# Patient Record
Sex: FEMALE | Race: WHITE | URBAN - METROPOLITAN AREA
[De-identification: names, ages, dates, MRNs, and addresses within clinical notes are randomized per-mention and may not be internally consistent; named-entity substitution may affect disease eponyms.]

---

## 2017-02-21 PROBLEM — 110483000 TOBACCO USE: Status: ACTIVE | Noted: 2017-02-20

## 2017-06-02 PROBLEM — 14760008 CONSTIPATION: Status: ACTIVE | Noted: 2017-06-02

## 2017-06-02 PROBLEM — 193462001 INSOMNIA: Status: ACTIVE | Noted: 2017-06-02

## 2021-05-11 ENCOUNTER — OFFICE VISIT (OUTPATIENT)
Dept: URBAN - METROPOLITAN AREA CLINIC 35 | Facility: CLINIC | Age: 71
End: 2021-05-11

## 2021-05-11 VITALS
DIASTOLIC BLOOD PRESSURE: 78 MMHG | WEIGHT: 113 LBS | BODY MASS INDEX: 20.8 KG/M2 | SYSTOLIC BLOOD PRESSURE: 148 MMHG | HEIGHT: 62 IN

## 2021-05-11 RX ORDER — SUCRALFATE 1 G
1 TABLET ON AN EMPTY STOMACH, 2 HOURS APART FROM OTHER MEDS TABLET ORAL TWICE A DAY
Qty: 60 | Refills: 1 | OUTPATIENT
Start: 2021-05-11

## 2021-05-11 RX ORDER — POLYETHYLENE GLYCOL 3350 17 G/17G
17 GM MIXED IN 10 OZ OF FLUID POWDER, FOR SOLUTION ORAL TWICE A DAY
Qty: 1504 GM | Refills: 2 | Status: DISCONTINUED | COMMUNITY

## 2021-05-11 RX ORDER — CARVEDILOL 3.12 MG/1
TABLET, FILM COATED ORAL TWICE A DAY
Status: ACTIVE | COMMUNITY

## 2021-05-11 RX ORDER — IRON BISGLY,PS/B12/BIOT/MAG/ZN 160-60/10
10 ML LIQUID (ML) ORAL ONCE A DAY
Qty: 300 MILLILITER | Refills: 1 | OUTPATIENT
Start: 2021-05-11

## 2021-05-11 RX ORDER — GABAPENTIN 300 MG/1
1 CAPSULE CAPSULE ORAL QHS
Status: ACTIVE | COMMUNITY

## 2021-05-11 RX ORDER — PANTOPRAZOLE SODIUM 40 MG/1
1 TABLET TABLET, DELAYED RELEASE ORAL
Qty: 60 | Refills: 2 | OUTPATIENT
Start: 2021-05-11

## 2021-05-11 RX ORDER — FERROUS SULFATE 142(45)MG
1 TABLET TABLET, EXTENDED RELEASE ORAL ONCE A DAY
Qty: 30 | Refills: 2 | OUTPATIENT
Start: 2021-05-11

## 2021-05-11 RX ORDER — SUCRALFATE 1 G/1
1 TABLET ON AN EMPTY STOMACH TABLET ORAL TID
Status: ACTIVE | COMMUNITY

## 2021-05-11 RX ORDER — ASCORBIC ACID, FOLIC ACID, VITAMIN B-12, BIOTIN, IRON, MAGNESIUM, ZINC 100; 1; 60; 150; 160; 6.5; 12; 5 MG/1; MG/1; UG/1; UG/1; MG/1; MG/1; MG/1; MG/1
1 TABLET TABLET ORAL ONCE A DAY
Qty: 90 TABLET | Status: DISCONTINUED | COMMUNITY
Start: 2019-12-06

## 2021-05-11 RX ORDER — MULTIVIT WITH MINERALS/LUTEIN
1 CAPSULE TABLET ORAL ONCE A DAY
Status: ACTIVE | COMMUNITY

## 2021-05-11 RX ORDER — ASCORBIC ACID, FOLIC ACID, VITAMIN B-12, BIOTIN, IRON, MAGNESIUM, ZINC 100; 1; 60; 150; 160; 6.5; 12; 5 MG/1; MG/1; UG/1; UG/1; MG/1; MG/1; MG/1; MG/1
1 TABLET TABLET ORAL ONCE A DAY
Qty: 90 TABLET | Refills: 1 | Status: DISCONTINUED | COMMUNITY

## 2021-05-11 RX ORDER — PANTOPRAZOLE SODIUM 40 MG/1
1 TABLET TABLET, DELAYED RELEASE ORAL ONCE A DAY
Status: ACTIVE | COMMUNITY

## 2021-05-11 RX ORDER — RANITIDINE HYDROCHLORIDE 300 MG/1
1 CAPSULE BEFORE BREAKFAST AND DINNER CAPSULE ORAL
Qty: 180 | Refills: 1 | Status: DISCONTINUED | COMMUNITY

## 2021-05-11 RX ORDER — CHOLECALCIFEROL TAB 50 MCG (2000 UNIT) 50 MCG
1 TABLET TAB ORAL ONCE A DAY
Status: ACTIVE | COMMUNITY

## 2021-05-11 RX ORDER — ESTRADIOL 0.75 MG/1.25G
1 APPLICATION TO SKIN GEL, METERED TOPICAL ONCE A DAY
Status: ACTIVE | COMMUNITY

## 2021-05-11 NOTE — HPI-MIGRATED HPI
Acute visit : Patient is here for an acute visit -> ER follow up Patient was admitted to [facility] due on 04/2020 due to melena, fatigue and nausea/vomiting w/ coffee-groound emesis. On admission her Hgb was 7.1 and Hct 21.8 s/p transfused with PRB. 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 S/p treated with endoscopic cautherization and medical mangement with PPI and carafate at that time.  EGD performed by me on 04/14/2021: The esophagus was normal. The GE junction was irregular. Moderate erosive gastritis was found in the gastric antrum/pre-pylorus region of the stomach and pylorus with gastric antrum bxx showing marked regenerative changes suggestive or reactive gastropthy. No evidence of H. pylori or IM. Normal duodenum  Patient was discharged in stable conditions and was prescribed Sucralfate 1 gm BID + Pantoprazole 40 mg BID  Reports: No improvement after being discharged, continues with intermittent lightheadness/dizziness and aching in the lower extremities that usually occur at night. Current BM: 1 BM daily, admits she ocassionally skips a day. Variation in consistency soft-hard. Denies melena or hematemesis. Patient denies taking iron supplements due to intolerance, usually causes her severe constipation and vomiting. Patient was previously on MaxaFe with no problems with SE  Patient was seen by GP, Dr. Spicer on 05/07/2021 and had repeat labs, see below;   Interim investigations : Labs done on: ->  * 05/07/2021, ordered by PCP:  - Glu: 90; BUN: 13; Na: 141; K: 4.1; Alb: 4.2; ALT: 17; AST: 18; ALP: 106 (H); Tbili: 0.5 - Thyroid stimulating hormne: 1.69 - WBC: 6.6; RBC: 3.52 (L); Hgb: 9.9 (L); Hct: 33.5; Plt: 319 - Fe: 14 (L); TIBC: 452 (H); UIBC: 4.38; Fe sat: 3 % (L); Ferritin: 16;

## 2021-05-14 ENCOUNTER — TELEPHONE ENCOUNTER (OUTPATIENT)
Dept: URBAN - METROPOLITAN AREA CLINIC 35 | Facility: CLINIC | Age: 71
End: 2021-05-14

## 2021-05-17 ENCOUNTER — TELEPHONE ENCOUNTER (OUTPATIENT)
Dept: URBAN - METROPOLITAN AREA CLINIC 35 | Facility: CLINIC | Age: 71
End: 2021-05-17

## 2021-07-11 ENCOUNTER — LAB OUTSIDE AN ENCOUNTER (OUTPATIENT)
Dept: URBAN - METROPOLITAN AREA CLINIC 35 | Facility: CLINIC | Age: 71
End: 2021-07-11

## 2021-07-13 LAB
% SATURATION: 11
ABSOLUTE BASOPHILS: 60
ABSOLUTE EOSINOPHILS: 248
ABSOLUTE LYMPHOCYTES: 1219
ABSOLUTE MONOCYTES: 677
ABSOLUTE NEUTROPHILS: 4496
BASOPHILS: 0.9
EOSINOPHILS: 3.7
FERRITIN: 36
HEMATOCRIT: 40.2
HEMOGLOBIN: 13.2
IRON BINDING CAPACITY: 358
IRON, TOTAL: 38
LYMPHOCYTES: 18.2
MCH: 29.9
MCHC: 32.8
MCV: 91
MONOCYTES: 10.1
MPV: 10
NEUTROPHILS: 67.1
PLATELET COUNT: 244
RDW: 17.3
RED BLOOD CELL COUNT: 4.42
WHITE BLOOD CELL COUNT: 6.7

## 2021-07-27 ENCOUNTER — OFFICE VISIT (OUTPATIENT)
Dept: URBAN - METROPOLITAN AREA CLINIC 35 | Facility: CLINIC | Age: 71
End: 2021-07-27

## 2021-07-27 ENCOUNTER — LAB OUTSIDE AN ENCOUNTER (OUTPATIENT)
Dept: URBAN - METROPOLITAN AREA CLINIC 35 | Facility: CLINIC | Age: 71
End: 2021-07-27

## 2021-07-27 VITALS
SYSTOLIC BLOOD PRESSURE: 142 MMHG | WEIGHT: 109 LBS | BODY MASS INDEX: 20.06 KG/M2 | HEART RATE: 60 BPM | OXYGEN SATURATION: 99 % | HEIGHT: 62 IN | DIASTOLIC BLOOD PRESSURE: 80 MMHG

## 2021-07-27 RX ORDER — IRON BISGLY,PS/B12/BIOT/MAG/ZN 160-60/10
10 ML LIQUID (ML) ORAL ONCE A DAY
Qty: 300 MILLILITER | Refills: 1 | Status: ON HOLD | COMMUNITY
Start: 2021-05-11

## 2021-07-27 RX ORDER — SUCRALFATE 1 G
1 TABLET ON AN EMPTY STOMACH, 2 HOURS APART FROM OTHER MEDS TABLET ORAL TWICE A DAY
Qty: 60 | Refills: 1 | Status: ON HOLD | COMMUNITY
Start: 2021-05-11

## 2021-07-27 RX ORDER — SUCRALFATE 1 G
1 TABLET ON AN EMPTY STOMACH, 2 HOURS APART FROM OTHER MEDS TABLET ORAL TWICE A DAY
Qty: 60 | Refills: 1 | OUTPATIENT

## 2021-07-27 RX ORDER — ESTRADIOL 0.75 MG/1.25G
1 APPLICATION TO SKIN GEL, METERED TOPICAL ONCE A DAY
Status: ACTIVE | COMMUNITY

## 2021-07-27 RX ORDER — GABAPENTIN 300 MG/1
1 CAPSULE CAPSULE ORAL QHS
Status: ACTIVE | COMMUNITY

## 2021-07-27 RX ORDER — FERROUS SULFATE 142(45)MG
2 TABLETS TABLET, EXTENDED RELEASE ORAL ONCE A DAY
Qty: 60 TABLET | Refills: 2 | OUTPATIENT

## 2021-07-27 RX ORDER — CARVEDILOL 3.12 MG/1
TABLET, FILM COATED ORAL TWICE A DAY
Status: ACTIVE | COMMUNITY

## 2021-07-27 RX ORDER — PANTOPRAZOLE SODIUM 40 MG/1
1 TABLET TABLET, DELAYED RELEASE ORAL
Qty: 60 | Refills: 2 | Status: ACTIVE | COMMUNITY
Start: 2021-05-11

## 2021-07-27 RX ORDER — PANTOPRAZOLE SODIUM 40 MG/1
1 TABLET TABLET, DELAYED RELEASE ORAL
Qty: 60 | Refills: 2

## 2021-07-27 RX ORDER — MULTIVIT WITH MINERALS/LUTEIN
1 CAPSULE TABLET ORAL ONCE A DAY
Status: ACTIVE | COMMUNITY

## 2021-07-27 RX ORDER — FERROUS SULFATE 142(45)MG
1 TABLET TABLET, EXTENDED RELEASE ORAL ONCE A DAY
Qty: 30 | Refills: 2 | Status: ACTIVE | COMMUNITY
Start: 2021-05-11

## 2021-07-27 RX ORDER — CHOLECALCIFEROL TAB 50 MCG (2000 UNIT) 50 MCG
1 TABLET TAB ORAL ONCE A DAY
Status: ACTIVE | COMMUNITY

## 2021-07-27 NOTE — HPI-MIGRATED HPI
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 S/p treated with endoscopic cautherization and medical mangement with PPI and carafate at that time.  Patient was admitted to [facility] due on 04/2020 due to melena, fatigue and nausea/vomiting w/ coffee-ground emesis. On admission her Hgb was 7.1 and Hct 21.8 s/p transfused with PRB.   EGD performed by me on 04/14/2021: The esophagus was normal. The GE junction was irregular. Moderate erosive gastritis was found in the gastric antrum/pre-pylorus region of the stomach and pylorus with gastric antrum bxx showing marked regenerative changes suggestive or reactive gastropthy. No evidence of H. pylori or IM. Normal duodenum.  Patient has been on Slow Fe daily. Manufacture discontinude MaxFe. Reports: patient denies lightheadness/dizziness  Also taking Pantoprazole 40 mg BID  Patient was also prescribed Carafate 1 gm BID but only took med for 1 month and discontinues b/c it didnt help with epigastric pain She has been taking OTC Zantac with eating;   Follow up OV : Current symptoms are -> Patient admits heartburn and epigastric pain after eating. Patient admits she only eats once a day to avoid pain.  Current BM: 3-4 loose BM daily which started about about a month ago. Loose stools is after everytime she eats.  No nocturnal diarrhea  Patient denies blood in stool or melena Patient denies trying any OTC medications Had lap CCY in 2000's;   Follow up OV : Patient is here for a routine OV for -> Iron deficiency anemia and Gastric ulcer;   Interim investigations : Labs done on: ->  * 07/12/2021, see below;

## 2021-08-03 ENCOUNTER — TELEPHONE ENCOUNTER (OUTPATIENT)
Dept: URBAN - METROPOLITAN AREA CLINIC 35 | Facility: CLINIC | Age: 71
End: 2021-08-03

## 2021-08-04 ENCOUNTER — OFFICE VISIT (OUTPATIENT)
Dept: URBAN - METROPOLITAN AREA SURGERY CENTER 8 | Facility: SURGERY CENTER | Age: 71
End: 2021-08-04

## 2021-08-20 ENCOUNTER — OFFICE VISIT (OUTPATIENT)
Dept: URBAN - METROPOLITAN AREA CLINIC 35 | Facility: CLINIC | Age: 71
End: 2021-08-20

## 2021-08-20 VITALS — BODY MASS INDEX: 19.14 KG/M2 | HEIGHT: 62 IN | WEIGHT: 104 LBS

## 2021-08-20 PROBLEM — 62315008: Status: ACTIVE | Noted: 2021-07-27

## 2021-08-20 RX ORDER — ZOLPIDEM TARTRATE 5 MG/1
1 TABLET AT BEDTIME AS NEEDED TABLET, FILM COATED ORAL ONCE A DAY
Status: ACTIVE | COMMUNITY

## 2021-08-20 RX ORDER — MULTIVIT WITH MINERALS/LUTEIN
1 CAPSULE TABLET ORAL ONCE A DAY
Status: ACTIVE | COMMUNITY

## 2021-08-20 RX ORDER — PANTOPRAZOLE SODIUM 40 MG/1
1 TABLET TABLET, DELAYED RELEASE ORAL
Qty: 30 | Refills: 3

## 2021-08-20 RX ORDER — GABAPENTIN 300 MG/1
1 CAPSULE CAPSULE ORAL QHS
Status: ACTIVE | COMMUNITY

## 2021-08-20 RX ORDER — CARVEDILOL 3.12 MG/1
TABLET, FILM COATED ORAL TWICE A DAY
Status: ACTIVE | COMMUNITY

## 2021-08-20 RX ORDER — IRON BISGLY,PS/B12/BIOT/MAG/ZN 160-60/10
10 ML LIQUID (ML) ORAL ONCE A DAY
Qty: 300 MILLILITER | Refills: 1 | Status: ON HOLD | COMMUNITY
Start: 2021-05-11

## 2021-08-20 RX ORDER — FERROUS SULFATE 142(45)MG
2 TABLETS TABLET, EXTENDED RELEASE ORAL ONCE A DAY
Qty: 60 TABLET | Refills: 2 | Status: ACTIVE | COMMUNITY

## 2021-08-20 RX ORDER — CHOLECALCIFEROL TAB 50 MCG (2000 UNIT) 50 MCG
1 TABLET TAB ORAL ONCE A DAY
Status: ACTIVE | COMMUNITY

## 2021-08-20 RX ORDER — FAMOTIDINE 10 MG
2 CAPSULES TABLET ORAL
Qty: 120 | Refills: 5 | OUTPATIENT
Start: 2021-08-20

## 2021-08-20 RX ORDER — FERROUS SULFATE 142(45)MG
2 TABLETS TABLET, EXTENDED RELEASE ORAL ONCE A DAY
Qty: 60 TABLET | Refills: 2

## 2021-08-20 RX ORDER — PANTOPRAZOLE SODIUM 40 MG/1
1 TABLET TABLET, DELAYED RELEASE ORAL
Qty: 60 | Refills: 2 | Status: ACTIVE | COMMUNITY

## 2021-08-20 RX ORDER — ESTRADIOL 0.75 MG/1.25G
1 APPLICATION TO SKIN GEL, METERED TOPICAL ONCE A DAY
Status: ACTIVE | COMMUNITY

## 2021-08-20 NOTE — EXAM-MIGRATED EXAMINATIONS
GENERAL APPEARANCE: - alert, in no acute distress, well developed, well nourished;   HEAD: - normocephalic, atraumatic;   EYES: - sclera anicteric bilaterally;   NECK/THYROID: - neck supple, full range of motion, no carotid bruit, no jugular venous distention, no lymphadenopathy, no thyroid nodules, no thyromegaly, trachea midline;   NEUROLOGIC: - cranial nerves 2-12 grossly intact;   PSYCH: - good eye contact, mood/affect full range;

## 2021-08-20 NOTE — HPI-MIGRATED HPI
Post-op OV : Patient reports of current symptoms -> epigastric pain after meals and with mild improvement when she takes Zantac PRN, denies dysphagia ;   Post-op OV : Patient -> denies any new changes in his/her health status since last OV.  Patient continues taking Pantoprazole 40 mg BID + Zantac PRN  Patient was previously prescribed Carafate 1 gm BID , which she took for 1 month only and discontinued due to no relief of symptoms;   Post-op OV : After EGD on -> 08/04/2021, done due to epigastric pain and re-assess Gastritis and history of gastric ulcers. The esophagus was normal with distal esophageal bxx showing mild reflux esophagitis, negative for IM. The GE junction was irregular. Moderate gastritis was found in the gastric antrum with bxx showing reactive gastropathy with positive for intestinal metaplasia. Mild gastritis and bilious gastric fluid was found in the gastric body with bxx showing reactive gastropathy. No evidence of H. pylori. Normal duodenum;   Post-op OV : Patient denies -> rectal bleeding, fever, nausea & vomiting since the procedure date;   Post-op OV : After ERCP on -> ;   Follow up OV : Last OV was -> 3 weeks ago Iron deficiency anemia:  Patient was advised to increase OTC Slow Fe to 2 tab daily  Plan to repeat labs 11/2022 Patient had repeat EGD done as mentioned above   Diarrhea:  Patient was advised to take  OTC Imodium 2 mg Qhrs PRN due to c/o of diarrhea last OV.  However, diarrhea has resolved and doesn't take medication. Patient admits mild constipation while taking Slow Fe. Therefore, take OTC Women's General Laxative Current BM: 1 soft BM daily when she uses OTC Genereal Laxative PRN, denies blood in stool or melena;   Follow up OV : Current symptoms are -> ;   Follow up OV : Patient is here for a routine OV for -> Iron deficiency anemia and Diarrhea;

## 2021-11-01 ENCOUNTER — LAB OUTSIDE AN ENCOUNTER (OUTPATIENT)
Dept: URBAN - METROPOLITAN AREA CLINIC 35 | Facility: CLINIC | Age: 71
End: 2021-11-01

## 2021-11-02 LAB
% SATURATION: 14
ABSOLUTE BASOPHILS: 63
ABSOLUTE EOSINOPHILS: 229
ABSOLUTE LYMPHOCYTES: 1375
ABSOLUTE MONOCYTES: 774
ABSOLUTE NEUTROPHILS: 5459
BASOPHILS: 0.8
EOSINOPHILS: 2.9
FERRITIN: 40
HEMATOCRIT: 41.4
HEMOGLOBIN: 13.6
IRON BINDING CAPACITY: 359
IRON, TOTAL: 50
LYMPHOCYTES: 17.4
MCH: 31.7
MCHC: 32.9
MCV: 96.5
MONOCYTES: 9.8
MPV: 10.6
NEUTROPHILS: 69.1
PLATELET COUNT: 284
RDW: 13.2
RED BLOOD CELL COUNT: 4.29
WHITE BLOOD CELL COUNT: 7.9

## 2021-11-09 ENCOUNTER — OFFICE VISIT (OUTPATIENT)
Dept: URBAN - METROPOLITAN AREA CLINIC 35 | Facility: CLINIC | Age: 71
End: 2021-11-09

## 2021-11-09 ENCOUNTER — LAB OUTSIDE AN ENCOUNTER (OUTPATIENT)
Dept: URBAN - METROPOLITAN AREA CLINIC 35 | Facility: CLINIC | Age: 71
End: 2021-11-09

## 2021-11-09 VITALS — BODY MASS INDEX: 18.77 KG/M2 | WEIGHT: 102 LBS | HEIGHT: 62 IN

## 2021-11-09 RX ORDER — PANTOPRAZOLE SODIUM 40 MG/1
1 TABLET TABLET, DELAYED RELEASE ORAL
Qty: 90 | Refills: 4

## 2021-11-09 RX ORDER — PANTOPRAZOLE SODIUM 40 MG/1
1 TABLET TABLET, DELAYED RELEASE ORAL
Qty: 30 | Refills: 3 | Status: ACTIVE | COMMUNITY

## 2021-11-09 RX ORDER — FERROUS SULFATE 142(45)MG
1 TABLET TABLET, EXTENDED RELEASE ORAL ONCE A DAY
Qty: 30 TABLET | Refills: 2

## 2021-11-09 RX ORDER — MULTIVIT WITH MINERALS/LUTEIN
1 CAPSULE TABLET ORAL ONCE A DAY
Status: ACTIVE | COMMUNITY

## 2021-11-09 RX ORDER — CARVEDILOL 3.12 MG/1
TABLET, FILM COATED ORAL TWICE A DAY
Status: ACTIVE | COMMUNITY

## 2021-11-09 RX ORDER — IRON BISGLY,PS/B12/BIOT/MAG/ZN 160-60/10
10 ML LIQUID (ML) ORAL ONCE A DAY
Qty: 300 MILLILITER | Refills: 1 | Status: ON HOLD | COMMUNITY
Start: 2021-05-11

## 2021-11-09 RX ORDER — GABAPENTIN 300 MG/1
1 CAPSULE CAPSULE ORAL QHS
Status: ACTIVE | COMMUNITY

## 2021-11-09 RX ORDER — FAMOTIDINE 10 MG
2 CAPSULES TABLET ORAL
Qty: 120 | Refills: 5

## 2021-11-09 RX ORDER — SODIUM, POTASSIUM,MAG SULFATES 17.5-3.13G
177ML SOLUTION, RECONSTITUTED, ORAL ORAL ONCE
Qty: 1 KIT | Refills: 0 | OUTPATIENT
Start: 2021-11-09

## 2021-11-09 RX ORDER — FAMOTIDINE 10 MG
2 CAPSULES TABLET ORAL
Qty: 120 | Refills: 5 | Status: ACTIVE | COMMUNITY
Start: 2021-08-20

## 2021-11-09 RX ORDER — FERROUS SULFATE 142(45)MG
2 TABLETS TABLET, EXTENDED RELEASE ORAL ONCE A DAY
Qty: 60 TABLET | Refills: 2 | Status: ACTIVE | COMMUNITY

## 2021-11-09 RX ORDER — CHOLECALCIFEROL TAB 50 MCG (2000 UNIT) 50 MCG
1 TABLET TAB ORAL ONCE A DAY
Status: ACTIVE | COMMUNITY

## 2021-11-09 RX ORDER — ZOLPIDEM TARTRATE 5 MG/1
1 TABLET AT BEDTIME AS NEEDED TABLET, FILM COATED ORAL ONCE A DAY
Status: ACTIVE | COMMUNITY

## 2021-11-09 RX ORDER — ESTRADIOL 0.75 MG/1.25G
1 APPLICATION TO SKIN GEL, METERED TOPICAL ONCE A DAY
Status: ACTIVE | COMMUNITY

## 2021-11-09 NOTE — HPI-MIGRATED HPI
Colorectal cancer screening : Current bowel movement patterns -> One soft BM daily;   Colorectal cancer screening : Family history of GI malignancy: -> Denies;   Colorectal cancer screening : Patient is here for -> high risk surveillance colonoscopy due to personal history of colonic polyps;   Colorectal cancer screening : Last colonoscopy was -> 2016;   Colorectal cancer screening : Patients denies -> rectal bleeding, change in bowel habits, constipation, diarrhea, or abdominal pain;   Interim investigations : Labs done on: ->  * 11/01/2021, see below;   Follow up OV : Patient is here for a routine OV for -> Iron deficiency anemia/Gastritis/Gastric ulcers, Epigastric pain and Constipation;   Follow up OV : Last OV was -> 3 weeks ago  Patient has a 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 S/p treated with endoscopic cautherization and medical mangement with PPI and carafate at that time.  Patient was admitted to [facility] on 04/2020 due to melena, fatigue and nausea/vomiting w/ coffee-ground emesis. On admission her Hgb was 7.1 and Hct 21.8 s/p transfused with PRBC.   Subsequently had an EGD done 04/14/2021 performed by me. The esophagus was normal. The GE junction was irregular. Moderate erosive gastritis was found in the gastric antrum/pre-pylorus region of the stomach and pylorus with gastric antrum bxx showing marked regenerative changes suggestive or reactive gastropthy. No evidence of H. pylori or IM  Then had an EGD done 08/04/2021, done due to epigastric pain. The esophagus was normal with distal esophageal bxx showing mild reflux esophagitis, negative for IM. The GE junction was irregular. Moderate gastritis was found in the gastric antrum with bxx showing reactive gastropathy with positive for intestinal metaplasia. Mild gastritis and bilious gastric fluid was found in the gastric body with bxx showing reactive gastropathy. No evidence of H. pylori. Normal duodenum.  Patient has been taking Pantoprazole 40 mg BID but patient c/o of epigastric pain despite taking PPI.  Therefore was prescribed Zantac 360 ER, 3-6 caps daily + Pantoprazole 40 mg BID Patient is taking Zantac 360, 2 cap BID and OTC antacid chewables PRN Current symptoms: epigastric pain after eating.  Patient was initally on Slow Fe daily but labs done 07/12/2021 showed Fe sat: 11 % (L); Ferritin: 36; Total, Iron: 38 (L) and Hgb: 13.2. Therefore, advised to increase OTC Slow Fe, 2 tab daily. Patient admits mild constipation while taking Slow Fe. Therefore, takes OTC Women's General Laxative PRN Current BM: 1 formed BM daily, denies blood in stool or melena;   Follow up OV : Current symptoms are -> ;     Colorectal cancer screening : Denies dysphagia or odynophagia Currently taking anticoagulants/NSAIDs: Denies;

## 2021-11-19 ENCOUNTER — OFFICE VISIT (OUTPATIENT)
Dept: URBAN - METROPOLITAN AREA SURGERY CENTER 8 | Facility: SURGERY CENTER | Age: 71
End: 2021-11-19

## 2021-12-07 ENCOUNTER — LAB OUTSIDE AN ENCOUNTER (OUTPATIENT)
Dept: URBAN - METROPOLITAN AREA CLINIC 35 | Facility: CLINIC | Age: 71
End: 2021-12-07

## 2021-12-07 ENCOUNTER — OFFICE VISIT (OUTPATIENT)
Dept: URBAN - METROPOLITAN AREA CLINIC 35 | Facility: CLINIC | Age: 71
End: 2021-12-07
Payer: COMMERCIAL

## 2021-12-07 VITALS
OXYGEN SATURATION: 99 % | DIASTOLIC BLOOD PRESSURE: 80 MMHG | WEIGHT: 108 LBS | SYSTOLIC BLOOD PRESSURE: 152 MMHG | HEIGHT: 62 IN | HEART RATE: 60 BPM | BODY MASS INDEX: 19.88 KG/M2

## 2021-12-07 DIAGNOSIS — K29.70 GASTRITIS WITHOUT BLEEDING, UNSPECIFIED CHRONICITY, UNSPECIFIED GASTRITIS TYPE: ICD-10-CM

## 2021-12-07 DIAGNOSIS — K25.7 CHRONIC GASTRIC ULCER WITHOUT HEMORRHAGE OR PERFORATION: ICD-10-CM

## 2021-12-07 DIAGNOSIS — Z86.010 PERSONAL HISTORY OF COLONIC POLYPS: ICD-10-CM

## 2021-12-07 DIAGNOSIS — K57.30 DIVERTICULOSIS OF LARGE INTESTINE WITHOUT PERFORATION OR ABSCESS WITHOUT BLEEDING: ICD-10-CM

## 2021-12-07 DIAGNOSIS — D50.9 IRON DEFICIENCY ANEMIA, UNSPECIFIED: ICD-10-CM

## 2021-12-07 DIAGNOSIS — K63.5 POLYP OF COLON, UNSPECIFIED PART OF COLON, UNSPECIFIED TYPE: ICD-10-CM

## 2021-12-07 DIAGNOSIS — K64.2 THIRD DEGREE HEMORRHOIDS: ICD-10-CM

## 2021-12-07 PROCEDURE — 99214 OFFICE O/P EST MOD 30 MIN: CPT | Performed by: INTERNAL MEDICINE

## 2021-12-07 RX ORDER — FERROUS SULFATE 142(45)MG
1 TABLET TABLET, EXTENDED RELEASE ORAL ONCE A DAY
Qty: 30 TABLET | Refills: 2 | Status: ACTIVE | COMMUNITY

## 2021-12-07 RX ORDER — FAMOTIDINE 10 MG
2 CAPSULES TABLET ORAL
Qty: 120 | Refills: 5 | Status: ACTIVE | COMMUNITY

## 2021-12-07 RX ORDER — ZOLPIDEM TARTRATE 5 MG/1
1 TABLET AT BEDTIME AS NEEDED TABLET, FILM COATED ORAL ONCE A DAY
Status: ACTIVE | COMMUNITY

## 2021-12-07 RX ORDER — CARVEDILOL 3.12 MG/1
TABLET, FILM COATED ORAL TWICE A DAY
Status: ACTIVE | COMMUNITY

## 2021-12-07 RX ORDER — CALCIUM CARBONATE 500 MG/1
4 TABLETS TABLET ORAL TID
Status: ON HOLD | COMMUNITY

## 2021-12-07 RX ORDER — FERROUS SULFATE 142(45)MG
1 TABLET TABLET, EXTENDED RELEASE ORAL ONCE A DAY
Qty: 30 TABLET | Refills: 2

## 2021-12-07 RX ORDER — PANTOPRAZOLE SODIUM 40 MG/1
1 TABLET TABLET, DELAYED RELEASE ORAL
Qty: 90 | Refills: 4 | Status: ACTIVE | COMMUNITY

## 2021-12-07 RX ORDER — GABAPENTIN 300 MG/1
1 CAPSULE CAPSULE ORAL QHS
Status: ACTIVE | COMMUNITY

## 2021-12-07 RX ORDER — FAMOTIDINE 10 MG
2 CAPSULES TABLET ORAL
Qty: 120 | Refills: 5

## 2021-12-07 RX ORDER — PANTOPRAZOLE SODIUM 40 MG/1
1 TABLET TABLET, DELAYED RELEASE ORAL
Qty: 90 | Refills: 4

## 2021-12-07 RX ORDER — CHOLECALCIFEROL TAB 50 MCG (2000 UNIT) 50 MCG
1 TABLET TAB ORAL ONCE A DAY
Status: ACTIVE | COMMUNITY

## 2021-12-07 RX ORDER — MULTIVIT WITH MINERALS/LUTEIN
1 CAPSULE TABLET ORAL ONCE A DAY
Status: ACTIVE | COMMUNITY

## 2021-12-07 RX ORDER — IRON BISGLY,PS/B12/BIOT/MAG/ZN 160-60/10
10 ML LIQUID (ML) ORAL ONCE A DAY
Qty: 300 MILLILITER | Refills: 1 | Status: DISCONTINUED | COMMUNITY
Start: 2021-05-11

## 2021-12-16 ENCOUNTER — ERX REFILL RESPONSE (OUTPATIENT)
Dept: URBAN - METROPOLITAN AREA CLINIC 37 | Facility: CLINIC | Age: 71
End: 2021-12-16

## 2021-12-16 RX ORDER — FERROUS SULFATE 137(45) MG
TAKE 1 TABLET BY MOUTH EVERY DAY FOR 30 DAYS TABLET, EXTENDED RELEASE ORAL
Qty: 30 TABLET | Refills: 3 | OUTPATIENT

## 2021-12-16 RX ORDER — FERROUS SULFATE 142(45)MG
1 TABLET TABLET, EXTENDED RELEASE ORAL ONCE A DAY
Qty: 30 TABLET | Refills: 2 | OUTPATIENT

## 2022-01-14 ENCOUNTER — OFFICE VISIT (OUTPATIENT)
Dept: URBAN - METROPOLITAN AREA SURGERY CENTER 8 | Facility: SURGERY CENTER | Age: 72
End: 2022-01-14

## 2022-01-25 ENCOUNTER — OFFICE VISIT (OUTPATIENT)
Dept: URBAN - METROPOLITAN AREA CLINIC 36 | Facility: CLINIC | Age: 72
End: 2022-01-25

## 2022-02-02 ENCOUNTER — TELEPHONE ENCOUNTER (OUTPATIENT)
Dept: URBAN - METROPOLITAN AREA SURGERY CENTER 8 | Facility: SURGERY CENTER | Age: 72
End: 2022-02-02

## 2022-02-04 ENCOUNTER — OFFICE VISIT (OUTPATIENT)
Dept: URBAN - METROPOLITAN AREA SURGERY CENTER 8 | Facility: SURGERY CENTER | Age: 72
End: 2022-02-04

## 2022-02-09 ENCOUNTER — LAB OUTSIDE AN ENCOUNTER (OUTPATIENT)
Dept: URBAN - METROPOLITAN AREA CLINIC 35 | Facility: CLINIC | Age: 72
End: 2022-02-09

## 2022-02-22 ENCOUNTER — OFFICE VISIT (OUTPATIENT)
Dept: URBAN - METROPOLITAN AREA CLINIC 35 | Facility: CLINIC | Age: 72
End: 2022-02-22

## 2022-03-29 ENCOUNTER — OFFICE VISIT (OUTPATIENT)
Dept: URBAN - METROPOLITAN AREA CLINIC 35 | Facility: CLINIC | Age: 72
End: 2022-03-29

## 2022-04-01 ENCOUNTER — TELEPHONE ENCOUNTER (OUTPATIENT)
Dept: URBAN - METROPOLITAN AREA CLINIC 36 | Facility: CLINIC | Age: 72
End: 2022-04-01

## 2022-04-01 RX ORDER — FERROUS SULFATE 137(45) MG
TAKE 1 TABLET BY MOUTH EVERY DAY FOR 30 DAYS TABLET, EXTENDED RELEASE ORAL
Qty: 30 TABLET | Refills: 3 | Status: ACTIVE | COMMUNITY

## 2022-04-01 RX ORDER — SUCRALFATE 1 G
1 TABLET ON AN EMPTY STOMACH AND 2 HOURS APART FROM OTHER MEDS TABLET ORAL TWICE A DAY
Qty: 60 | Refills: 2 | OUTPATIENT
Start: 2022-04-01 | End: 2022-06-30

## 2022-04-01 RX ORDER — MULTIVIT WITH MINERALS/LUTEIN
1 CAPSULE TABLET ORAL ONCE A DAY
Status: ACTIVE | COMMUNITY

## 2022-04-01 RX ORDER — CARVEDILOL 3.12 MG/1
TABLET, FILM COATED ORAL TWICE A DAY
Status: ACTIVE | COMMUNITY

## 2022-04-01 RX ORDER — CALCIUM CARBONATE 500 MG/1
4 TABLETS TABLET ORAL TID
Status: ON HOLD | COMMUNITY

## 2022-04-01 RX ORDER — ZOLPIDEM TARTRATE 5 MG/1
1 TABLET AT BEDTIME AS NEEDED TABLET, FILM COATED ORAL ONCE A DAY
Status: ACTIVE | COMMUNITY

## 2022-04-01 RX ORDER — FAMOTIDINE 10 MG
2 CAPSULES TABLET ORAL
Qty: 120 | Refills: 5 | Status: ACTIVE | COMMUNITY

## 2022-04-01 RX ORDER — PANTOPRAZOLE SODIUM 40 MG/1
1 TABLET TABLET, DELAYED RELEASE ORAL
Qty: 90 | Refills: 4 | Status: ACTIVE | COMMUNITY

## 2022-04-01 RX ORDER — GABAPENTIN 300 MG/1
1 CAPSULE CAPSULE ORAL QHS
Status: ACTIVE | COMMUNITY

## 2022-04-01 RX ORDER — CHOLECALCIFEROL TAB 50 MCG (2000 UNIT) 50 MCG
1 TABLET TAB ORAL ONCE A DAY
Status: ACTIVE | COMMUNITY

## 2022-04-20 PROBLEM — 68496003: Status: ACTIVE | Noted: 2021-12-03

## 2022-04-21 ENCOUNTER — OFFICE VISIT (OUTPATIENT)
Dept: URBAN - METROPOLITAN AREA CLINIC 37 | Facility: CLINIC | Age: 72
End: 2022-04-21

## 2022-04-21 VITALS — HEIGHT: 62 IN

## 2022-04-21 RX ORDER — FAMOTIDINE 10 MG
2 CAPSULES TABLET ORAL
Qty: 120 | Refills: 5

## 2022-04-21 RX ORDER — FERROUS SULFATE 142(45)MG
1 TABLET TABLET, EXTENDED RELEASE ORAL ONCE A DAY
Qty: 30 TABLET | Refills: 2

## 2022-04-21 RX ORDER — PANTOPRAZOLE SODIUM 40 MG/1
1 TABLET TABLET, DELAYED RELEASE ORAL
Qty: 90 | Refills: 4

## 2022-05-03 ENCOUNTER — OFFICE VISIT (OUTPATIENT)
Dept: URBAN - METROPOLITAN AREA CLINIC 35 | Facility: CLINIC | Age: 72
End: 2022-05-03

## 2022-05-17 ENCOUNTER — OFFICE VISIT (OUTPATIENT)
Dept: URBAN - METROPOLITAN AREA CLINIC 35 | Facility: CLINIC | Age: 72
End: 2022-05-17

## 2022-05-26 ENCOUNTER — OFFICE VISIT (OUTPATIENT)
Dept: URBAN - METROPOLITAN AREA CLINIC 37 | Facility: CLINIC | Age: 72
End: 2022-05-26

## 2022-07-28 ENCOUNTER — OFFICE VISIT (OUTPATIENT)
Dept: URBAN - METROPOLITAN AREA CLINIC 37 | Facility: CLINIC | Age: 72
End: 2022-07-28

## 2022-08-02 ENCOUNTER — TELEPHONE ENCOUNTER (OUTPATIENT)
Dept: URBAN - METROPOLITAN AREA CLINIC 36 | Facility: CLINIC | Age: 72
End: 2022-08-02

## 2022-08-11 ENCOUNTER — OFFICE VISIT (OUTPATIENT)
Dept: URBAN - METROPOLITAN AREA CLINIC 37 | Facility: CLINIC | Age: 72
End: 2022-08-11
Payer: COMMERCIAL

## 2022-08-11 ENCOUNTER — DASHBOARD ENCOUNTERS (OUTPATIENT)
Age: 72
End: 2022-08-11

## 2022-08-11 VITALS
HEART RATE: 60 BPM | WEIGHT: 107 LBS | DIASTOLIC BLOOD PRESSURE: 66 MMHG | HEIGHT: 62 IN | SYSTOLIC BLOOD PRESSURE: 146 MMHG | OXYGEN SATURATION: 99 % | BODY MASS INDEX: 19.69 KG/M2

## 2022-08-11 DIAGNOSIS — K64.2 THIRD DEGREE HEMORRHOIDS: ICD-10-CM

## 2022-08-11 DIAGNOSIS — D50.9 IRON DEFICIENCY ANEMIA, UNSPECIFIED: ICD-10-CM

## 2022-08-11 DIAGNOSIS — K25.7 CHRONIC GASTRIC ULCER WITHOUT HEMORRHAGE OR PERFORATION: ICD-10-CM

## 2022-08-11 DIAGNOSIS — Z86.010 PERSONAL HISTORY OF COLONIC POLYPS: ICD-10-CM

## 2022-08-11 DIAGNOSIS — K29.60 ADENOPAPILLOMATOSIS GASTRICA: ICD-10-CM

## 2022-08-11 DIAGNOSIS — K57.30 DIVERTICULOSIS OF LARGE INTESTINE WITHOUT PERFORATION OR ABSCESS WITHOUT BLEEDING: ICD-10-CM

## 2022-08-11 PROBLEM — 724538004: Status: ACTIVE | Noted: 2021-12-03

## 2022-08-11 PROBLEM — 721705006: Status: ACTIVE | Noted: 2021-12-03

## 2022-08-11 PROBLEM — 428283002: Status: ACTIVE | Noted: 2021-07-27

## 2022-08-11 PROBLEM — 4556007: Status: ACTIVE | Noted: 2021-08-19

## 2022-08-11 PROCEDURE — 99214 OFFICE O/P EST MOD 30 MIN: CPT | Performed by: INTERNAL MEDICINE

## 2022-08-11 RX ORDER — PANTOPRAZOLE SODIUM 40 MG/1
1 TABLET TABLET, DELAYED RELEASE ORAL
Qty: 90 | Refills: 4 | Status: ACTIVE | COMMUNITY

## 2022-08-11 RX ORDER — CARVEDILOL 12.5 MG/1
1 TABLET WITH FOOD TABLET, FILM COATED ORAL TWICE A DAY
Status: ACTIVE | COMMUNITY

## 2022-08-11 RX ORDER — KRILL/OM-3/DHA/EPA/PHOSPHO/AST 1000-230MG
1 TABLET CAPSULE ORAL ONCE A DAY
Status: ACTIVE | COMMUNITY

## 2022-08-11 RX ORDER — FERROUS SULFATE 142(45)MG
1 TABLET TABLET, EXTENDED RELEASE ORAL ONCE A DAY
Qty: 30 TABLET | Refills: 2 | Status: ACTIVE | COMMUNITY

## 2022-08-11 RX ORDER — FAMOTIDINE 10 MG
2 CAPSULES TABLET ORAL
Qty: 120 | Refills: 5 | Status: ACTIVE | COMMUNITY

## 2022-08-11 RX ORDER — CLOPIDOGREL BISULFATE 75 MG/1
TAKE 1 TABLET BY MOUTH EVERY DAY TABLET, FILM COATED ORAL
Qty: 90 EACH | Refills: 0 | Status: ACTIVE | COMMUNITY

## 2022-08-11 RX ORDER — PANTOPRAZOLE SODIUM 40 MG/1
1 TABLET TABLET, DELAYED RELEASE ORAL
Qty: 90 | Refills: 4

## 2022-08-11 RX ORDER — FERROUS SULFATE 142(45)MG
1 TABLET TABLET, EXTENDED RELEASE ORAL ONCE A DAY
Qty: 30 TABLET | Refills: 2

## 2022-08-11 RX ORDER — GABAPENTIN 300 MG/1
1 CAPSULE CAPSULE ORAL QHS
Status: ACTIVE | COMMUNITY

## 2022-08-11 RX ORDER — MULTIVIT WITH MINERALS/LUTEIN
1 CAPSULE TABLET ORAL ONCE A DAY
Status: ACTIVE | COMMUNITY

## 2022-08-11 RX ORDER — ZOLPIDEM TARTRATE 5 MG/1
1 TABLET AT BEDTIME AS NEEDED TABLET, FILM COATED ORAL ONCE A DAY
Status: ON HOLD | COMMUNITY

## 2022-08-11 RX ORDER — FAMOTIDINE 10 MG
2 CAPSULES TABLET ORAL
Qty: 120 | Refills: 5

## 2022-08-11 RX ORDER — CALCIUM CARBONATE 500 MG/1
4 TABLETS TABLET ORAL TID
Status: ON HOLD | COMMUNITY

## 2022-08-11 RX ORDER — CALCIUM CARBONATE 160(400)MG
8-10 TABLETS AS NEEDED TABLET,CHEWABLE ORAL
Status: ACTIVE | COMMUNITY

## 2022-08-11 RX ORDER — CHOLECALCIFEROL TAB 50 MCG (2000 UNIT) 50 MCG
1 TABLET TAB ORAL ONCE A DAY
Status: ACTIVE | COMMUNITY

## 2022-08-11 NOTE — PHYSICAL EXAM CONSTITUTIONAL:
----- Message from [facility] sent at 7/14/2021  2:32 PM EDT -----  Subject: Message to Provider    QUESTIONS  Information for Provider? to Jen - Pt was wondering what can be done   about her restless nights. She states that she isn't able to sleep more   than 2 hours. She's consistently tossing and turning. Any suggestions? Pt   has already tried a few things that the dr suggested, including melatonin. Pls contact the pt about this.   ---------------------------------------------------------------------------  --------------  CALL BACK INFO  What is the best way for the office to contact you? OK to leave message on   voicemail  Preferred Call Back Phone Number? [phone]  ---------------------------------------------------------------------------  --------------  SCRIPT ANSWERS  Relationship to Patient?  Self well developed, well nourished , in no acute distress , ambulating without difficulty , normal communication ability

## 2022-10-05 ENCOUNTER — P2P PATIENT RECORD (OUTPATIENT)
Age: 72
End: 2022-10-05

## 2022-10-10 ENCOUNTER — LAB OUTSIDE AN ENCOUNTER (OUTPATIENT)
Dept: URBAN - METROPOLITAN AREA CLINIC 37 | Facility: CLINIC | Age: 72
End: 2022-10-10

## 2022-10-27 ENCOUNTER — OFFICE VISIT (OUTPATIENT)
Dept: URBAN - METROPOLITAN AREA CLINIC 37 | Facility: CLINIC | Age: 72
End: 2022-10-27